# Patient Record
Sex: FEMALE | Race: WHITE | NOT HISPANIC OR LATINO | Employment: OTHER | URBAN - METROPOLITAN AREA
[De-identification: names, ages, dates, MRNs, and addresses within clinical notes are randomized per-mention and may not be internally consistent; named-entity substitution may affect disease eponyms.]

---

## 2017-04-19 ENCOUNTER — ALLSCRIPTS OFFICE VISIT (OUTPATIENT)
Dept: OTHER | Facility: OTHER | Age: 72
End: 2017-04-19

## 2017-04-26 DIAGNOSIS — Z12.31 ENCOUNTER FOR SCREENING MAMMOGRAM FOR MALIGNANT NEOPLASM OF BREAST: ICD-10-CM

## 2017-11-21 ENCOUNTER — ANESTHESIA EVENT (OUTPATIENT)
Dept: PERIOP | Facility: AMBULARY SURGERY CENTER | Age: 72
End: 2017-11-21
Payer: COMMERCIAL

## 2017-12-05 ENCOUNTER — ANESTHESIA (OUTPATIENT)
Dept: PERIOP | Facility: AMBULARY SURGERY CENTER | Age: 72
End: 2017-12-05
Payer: COMMERCIAL

## 2017-12-05 ENCOUNTER — HOSPITAL ENCOUNTER (OUTPATIENT)
Facility: AMBULARY SURGERY CENTER | Age: 72
Setting detail: OUTPATIENT SURGERY
Discharge: HOME | End: 2017-12-05
Attending: SURGERY | Admitting: SURGERY
Payer: COMMERCIAL

## 2017-12-05 VITALS
OXYGEN SATURATION: 98 % | DIASTOLIC BLOOD PRESSURE: 70 MMHG | TEMPERATURE: 99.6 F | SYSTOLIC BLOOD PRESSURE: 118 MMHG | BODY MASS INDEX: 23.92 KG/M2 | WEIGHT: 135 LBS | HEART RATE: 74 BPM | HEIGHT: 63 IN | RESPIRATION RATE: 20 BRPM

## 2017-12-05 DIAGNOSIS — Z80.0 FAMILY HISTORY OF MALIGNANT NEOPLASM OF DIGESTIVE ORGAN: ICD-10-CM

## 2017-12-05 PROCEDURE — 88305 TISSUE EXAM BY PATHOLOGIST: CPT | Performed by: SURGERY

## 2017-12-05 RX ORDER — PROPOFOL 10 MG/ML
INJECTION, EMULSION INTRAVENOUS AS NEEDED
Status: DISCONTINUED | OUTPATIENT
Start: 2017-12-05 | End: 2017-12-05 | Stop reason: SURG

## 2017-12-05 RX ORDER — SODIUM CHLORIDE 9 MG/ML
125 INJECTION, SOLUTION INTRAVENOUS CONTINUOUS
Status: DISCONTINUED | OUTPATIENT
Start: 2017-12-05 | End: 2017-12-05 | Stop reason: HOSPADM

## 2017-12-05 RX ADMIN — PROPOFOL 80 MG: 10 INJECTION, EMULSION INTRAVENOUS at 14:19

## 2017-12-05 RX ADMIN — SODIUM CHLORIDE 125 ML/HR: 0.9 INJECTION, SOLUTION INTRAVENOUS at 13:16

## 2017-12-05 RX ADMIN — PROPOFOL 50 MG: 10 INJECTION, EMULSION INTRAVENOUS at 14:22

## 2017-12-05 RX ADMIN — PROPOFOL 20 MG: 10 INJECTION, EMULSION INTRAVENOUS at 14:26

## 2017-12-05 NOTE — ANESTHESIA POSTPROCEDURE EVALUATION
Post-Op Assessment Note      CV Status:  Stable    Mental Status:  Alert and awake    Hydration Status:  Euvolemic    PONV Controlled:  Controlled    Airway Patency:  Patent    Post Op Vitals Reviewed: Yes          Staff: CRNA           BP      Temp     Pulse     Resp      SpO2

## 2017-12-05 NOTE — ANESTHESIA PREPROCEDURE EVALUATION
Review of Systems/Medical History  Patient summary reviewed  Chart reviewed  No history of anesthetic complications     Cardiovascular  Exercise tolerance: good,     Pulmonary  Negative pulmonary ROS ,        GI/Hepatic  Negative GI/hepatic ROS          Negative  ROS        Endo/Other  Negative endo/other ROS      GYN  Negative gynecology ROS          Hematology  Negative hematology ROS      Musculoskeletal  Negative musculoskeletal ROS        Neurology  Negative neurology ROS      Psychology   Negative psychology ROS            Physical Exam    Airway    Mallampati score: I  TM Distance: >3 FB  Neck ROM: full     Dental   No notable dental hx     Cardiovascular      Pulmonary      Other Findings        Anesthesia Plan  ASA Score- 1       Anesthesia Type- IV sedation with anesthesia with ASA Monitors  Additional Monitors:   Airway Plan:           Induction- intravenous  Informed Consent- Anesthetic plan and risks discussed with patient  I personally reviewed this patient with the CRNA  Discussed and agreed on the Anesthesia Plan with the CRNA  Huong Gimenez

## 2017-12-05 NOTE — H&P
History and Physical -General Surgical Care   Miranda Sommer 66 y o  female MRN: [id number]  Unit/Bed#: [facility] [place] Encounter: [id number]       Principal Problem:  Family history of colon cancer    HPI: Miranda Sommer is a 66y o  year old female who presents with family history of colon cancer  Last colonoscope was approximately 5 years ago  She denies any specific problems presently      Review of Systems    Historical Information   History reviewed  No pertinent past medical history  Past Surgical History:   Procedure Laterality Date    SKIN CANCER EXCISION      TONSILLECTOMY       Social History   History   Alcohol use Not on file     Comment: once or twice per year     History   Drug Use No     History   Smoking Status    Never Smoker   Smokeless Tobacco    Never Used     Family History   Problem Relation Age of Onset    Cancer Sister      colon and liver       Meds/Allergies     Prescriptions Prior to Admission   Medication    Cholecalciferol (VITAMIN D PO)    Multiple Vitamins-Minerals (MULTIVITAL PO)     Current Facility-Administered Medications   Medication Dose Route Frequency    sodium chloride 0 9 % infusion  125 mL/hr Intravenous Continuous       No Known Allergies        Blood pressure 130/66, pulse 85, temperature (!) 97 °F (36 1 °C), resp  rate 20, height 5' 3" (1 6 m), weight 61 2 kg (135 lb), SpO2 98 %  No intake or output data in the 24 hours ending 12/05/17 1410    PHYSICAL EXAM  General appearance: alert, appears stated age and cooperative  Lungs: clear to auscultation bilaterally  Heart: regular rate and rhythm, S1, S2 normal, no murmur, click, rub or gallop  Abdomen: soft, non-tender; bowel sounds normal; no masses,  no organomegaly  Rectal: deferred  Skin: Skin color, texture, turgor normal  No rashes or lesions    Lab Results:   No visits with results within 1 Day(s) from this visit  Latest known visit with results is:   No results found for any previous visit       Imaging Studies:     ASSESSMENT:  Family history of colon cancer    PLAN:  Risks and benefits for colonoscopy were discussed with her and she agrees to proceed    Counseling / Coordination of Care  Total time spent today  20 minutes  Greater than 50% of total time was spent with the patient and / or family counseling and / or coordination of care

## 2017-12-05 NOTE — OP NOTE
OPERATIVE REPORT  PATIENT NAME: Margarita Durbin    :  1945  MRN: [id number]  Pt Location: [facility]  GI ROOM 02    SURGERY DATE: 2017    Surgeon(s) and Role:     * Rich Araya DO - Primary    Preop Diagnosis:  Family history of malignant neoplasm of digestive organ [Z80 0]    Post-Op Diagnosis Codes:     * Family history of malignant neoplasm of digestive organ [Z80 0]    Procedure(s) (LRB):  COLONOSCOPY (N/A)    Specimen(s):  ID Type Source Tests Collected by Time Destination   1 : ascending colon polyp hot bx Tissue Polyp, Colorectal TISSUE EXAM Rich Diop,  2017 1428        Estimated Blood Loss:   Minimal    Drains:       Anesthesia Type:   IV Sedation with Anesthesia    Operative Indications:  Family history of malignant neoplasm of digestive organ [Z80 0]      Operative Findings: Thin benign appearing mucosal polyp of the ascending colon  Multiple hot biopsies were performed    Complications:   None    Procedure and Technique:  Patient was brought to the operative suite and identified by visualization, conversation, by armband  She was then placed in the left lateral side  Time-out was performed  After induction of IV sedation rectal exam was performed  No abnormalities were noted  Colonoscope was then inserted into the rectum and advanced all the way to the cecum  External pressure was required to help advance the scope  Cecum was identified by the appendiceal orifice  In the ascending colon there was a thickened area of of the bowels  Multiple hot biopsies were performed as this appeared to be a early polyp  There was good hemostasis  Circumferential views were performed of the colon on the way out  Once into the rectum, scope was retroflexed  No abnormalities were seen  She returned to the recovery area in stable condition      Recommend repeat colonoscopy in 3 years due to discovery of a polyp     I was present for the entire procedure    Patient Disposition:  PACU SIGNATURE: Royal Lopez DO  DATE: December 5, 2017  TIME: 2:37 PM

## 2018-01-13 VITALS
WEIGHT: 141 LBS | BODY MASS INDEX: 24.98 KG/M2 | HEIGHT: 63 IN | DIASTOLIC BLOOD PRESSURE: 70 MMHG | SYSTOLIC BLOOD PRESSURE: 106 MMHG

## 2018-05-01 ENCOUNTER — ANNUAL EXAM (OUTPATIENT)
Dept: OBGYN CLINIC | Facility: CLINIC | Age: 73
End: 2018-05-01
Payer: COMMERCIAL

## 2018-05-01 VITALS
WEIGHT: 131.2 LBS | SYSTOLIC BLOOD PRESSURE: 100 MMHG | HEIGHT: 62 IN | DIASTOLIC BLOOD PRESSURE: 60 MMHG | BODY MASS INDEX: 24.14 KG/M2

## 2018-05-01 DIAGNOSIS — Z12.31 ENCOUNTER FOR SCREENING MAMMOGRAM FOR MALIGNANT NEOPLASM OF BREAST: ICD-10-CM

## 2018-05-01 DIAGNOSIS — Z01.419 ENCOUNTER FOR GYNECOLOGICAL EXAMINATION WITHOUT ABNORMAL FINDING: Primary | ICD-10-CM

## 2018-05-01 DIAGNOSIS — M85.80 OSTEOPENIA, UNSPECIFIED LOCATION: ICD-10-CM

## 2018-05-01 PROCEDURE — S0612 ANNUAL GYNECOLOGICAL EXAMINA: HCPCS | Performed by: OBSTETRICS & GYNECOLOGY

## 2018-05-24 ENCOUNTER — TRANSCRIBE ORDERS (OUTPATIENT)
Dept: RADIOLOGY | Facility: CLINIC | Age: 73
End: 2018-05-24

## 2018-05-30 ENCOUNTER — HOSPITAL ENCOUNTER (OUTPATIENT)
Dept: RADIOLOGY | Age: 73
Discharge: HOME | End: 2018-05-30
Payer: COMMERCIAL

## 2018-05-30 DIAGNOSIS — M85.80 OSTEOPENIA, UNSPECIFIED LOCATION: ICD-10-CM

## 2018-05-30 PROCEDURE — 77080 DXA BONE DENSITY AXIAL: CPT

## 2018-06-06 ENCOUNTER — TELEPHONE (OUTPATIENT)
Dept: OBGYN CLINIC | Facility: CLINIC | Age: 73
End: 2018-06-06

## 2018-06-06 NOTE — TELEPHONE ENCOUNTER
----- Message from Ras Calderon MD sent at 6/6/2018 10:32 AM EDT -----  Can you please let Masha Rothman know I reviewed her DEXA scan  She still has osteopenia, which has not changed significantly since her last   They do recommend considering treatment because her hip fracture risk is 3%  If she would like to consider treatment please schedule her for an office visit  If she declines - would advise calcium and vitamin D  Thanks!

## 2018-06-06 NOTE — TELEPHONE ENCOUNTER
Left voicemail message today @ [phone] and [phone] for Pt to call back office regarding DEXA Scan result

## 2018-06-06 NOTE — TELEPHONE ENCOUNTER
Spoke with Pt today via phone call  Pt informed that recent DEXA Scan result still shows osteopenia which has not changed significantly since Pt's last DEXA Scan per Dr Gunter Lot review  Pt further informed that it is recommended that she consider treatment due to her hip fracture risk being 3% at this time per Dr Padmini Medina  Pt states that she is not interested in treatment at this time and would like to try OTC supplements first   Pt informed that a daily intake of at least 1200 mg Calcium and 800 to 1000 IU of Vitamin D supplements as well as weight bearing & muscle strengthening exercise, fall prevention, avoidance of tobacco, and excessive alcohol intake are recommended  Reiterated to Pt that if her symptoms worsen or she has questions/concerns, to contact office

## 2018-12-18 NOTE — DISCHARGE INSTRUCTIONS
Small polyp was found in the ascending colon  Hot biopsies were performed  Avoid aspirin or ibuprofen for 7 days    May continue regular diet      Resume home medications    Repeat colonoscopy in 3 years    Call Dr Dereck Smart at [phone] a questions or concerns Skin Substitute Text: The defect edges were debeveled with a #15 scalpel blade.  Given the location of the defect, shape of the defect and the proximity to free margins a skin substitute graft was deemed most appropriate.  The graft material was trimmed to fit the size of the defect. The graft was then placed in the primary defect and oriented appropriately.

## 2019-05-03 ENCOUNTER — ANNUAL EXAM (OUTPATIENT)
Dept: OBGYN CLINIC | Age: 74
End: 2019-05-03
Payer: COMMERCIAL

## 2019-05-03 VITALS
DIASTOLIC BLOOD PRESSURE: 62 MMHG | WEIGHT: 130 LBS | BODY MASS INDEX: 23.92 KG/M2 | SYSTOLIC BLOOD PRESSURE: 112 MMHG | HEIGHT: 62 IN

## 2019-05-03 DIAGNOSIS — Z78.0 ASYMPTOMATIC POSTMENOPAUSAL STATUS: ICD-10-CM

## 2019-05-03 DIAGNOSIS — Z12.31 ENCOUNTER FOR SCREENING MAMMOGRAM FOR MALIGNANT NEOPLASM OF BREAST: ICD-10-CM

## 2019-05-03 DIAGNOSIS — Z01.419 ENCOUNTER FOR GYNECOLOGICAL EXAMINATION WITHOUT ABNORMAL FINDING: Primary | ICD-10-CM

## 2019-05-03 PROCEDURE — S0612 ANNUAL GYNECOLOGICAL EXAMINA: HCPCS | Performed by: NURSE PRACTITIONER

## 2021-06-09 ENCOUNTER — OFFICE VISIT (OUTPATIENT)
Dept: FAMILY MEDICINE CLINIC | Facility: CLINIC | Age: 76
End: 2021-06-09
Payer: COMMERCIAL

## 2021-06-09 VITALS
OXYGEN SATURATION: 96 % | HEART RATE: 85 BPM | DIASTOLIC BLOOD PRESSURE: 70 MMHG | WEIGHT: 138.6 LBS | BODY MASS INDEX: 24.56 KG/M2 | HEIGHT: 63 IN | TEMPERATURE: 98.4 F | RESPIRATION RATE: 16 BRPM | SYSTOLIC BLOOD PRESSURE: 130 MMHG

## 2021-06-09 DIAGNOSIS — M25.562 ACUTE PAIN OF LEFT KNEE: ICD-10-CM

## 2021-06-09 DIAGNOSIS — M85.80 OSTEOPENIA, UNSPECIFIED LOCATION: ICD-10-CM

## 2021-06-09 DIAGNOSIS — Z85.820 PERSONAL HISTORY OF MALIGNANT MELANOMA: ICD-10-CM

## 2021-06-09 DIAGNOSIS — Z78.0 POST-MENOPAUSE: ICD-10-CM

## 2021-06-09 DIAGNOSIS — E78.5 HYPERLIPIDEMIA, UNSPECIFIED HYPERLIPIDEMIA TYPE: ICD-10-CM

## 2021-06-09 DIAGNOSIS — Z00.00 PHYSICAL EXAM: Primary | ICD-10-CM

## 2021-06-09 PROCEDURE — 1160F RVW MEDS BY RX/DR IN RCRD: CPT | Performed by: NURSE PRACTITIONER

## 2021-06-09 PROCEDURE — 3725F SCREEN DEPRESSION PERFORMED: CPT | Performed by: NURSE PRACTITIONER

## 2021-06-09 PROCEDURE — 1036F TOBACCO NON-USER: CPT | Performed by: NURSE PRACTITIONER

## 2021-06-09 PROCEDURE — 99387 INIT PM E/M NEW PAT 65+ YRS: CPT | Performed by: NURSE PRACTITIONER

## 2021-06-09 NOTE — PROGRESS NOTES
FAMILY PRACTICE OFFICE VISIT       NAME: Geovani Verde  AGE: 67 y o  SEX: female       : 1945        MRN: [id number]    Assessment and Plan     Problem List Items Addressed This Visit        Musculoskeletal and Integument    Osteopenia    Relevant Orders    DXA bone density spine hip and pelvis    CBC (Completed)    Comprehensive metabolic panel (Completed)    TSH, 3rd generation (Completed)       Other    Hyperlipidemia    Relevant Orders    Lipid panel (Completed)      Other Visit Diagnoses     Physical exam    -  Primary    Acute pain of left knee        Relevant Orders    CBC (Completed)    Comprehensive metabolic panel (Completed)    TSH, 3rd generation (Completed)    XR knee 3 vw left non injury    Post-menopause        Relevant Orders    DXA bone density spine hip and pelvis    Personal history of malignant melanoma              1  Physical exam     2  Acute pain of left knee  CBC    Comprehensive metabolic panel    TSH, 3rd generation    XR knee 3 vw left non injury   3  Osteopenia, unspecified location  DXA bone density spine hip and pelvis    CBC    Comprehensive metabolic panel    TSH, 3rd generation   4  Post-menopause  DXA bone density spine hip and pelvis   5  Hyperlipidemia, unspecified hyperlipidemia type  Lipid panel   6  Personal history of malignant melanoma       This 74-year-old female presents today for annual physical exam   She is a healthy-appearing adult female  New patient to our office, previous patient of Dr Maame Lilly  I have a hard copy of her previous records available to me in office today, and records are reviewed  Past medical history includes diverticulosis, osteopenia, hyperlipidemia, malignant melanoma excised in   Surgical history includes total hysterectomy with bilateral salpingo oophorectomy, tonsillectomy  Significant family history of cancer  Twin sister  from liver cancer and colon cancer  Mother  from breast cancer at age 61  Father had cancer, she is unsure believes it was esophageal cancer  Mammogram is due now  She is scheduled for repeat  Due for repeat DEXA scan  Prescription provided   Due for repeat colonoscopy  She will schedule with Dr Aminah Callaway  COVID-19 vaccinations are up-to-date  She will hold off on other vaccinations at this time  Acute left knee pain: I suspect this is osteoarthritis  Will check left knee x-ray  Recommend ice, elevation as needed  Tylenol or ibuprofen as needed  Advised use ibuprofen sparingly, as this medication increases risk for heart attack and stroke,  Also increases blood pressure  We discussed physical therapy, she would like to hold off at this time  If pain is persistent  Or worsening she will call  Osteopenia and post menopause:  Left hip osteopenia noted on DEXA scan completed in 2018  Recommend repeating DEXA scan  She is very active with weight-bearing activities  Takes vitamin-D and a multivitamin daily  Hyperlipidemia:  Will repeat lipid panel at this time  History of malignant melanoma:  Recommend annual dermatology skin surveillance exam     Follow up in one year for annual physical or sooner if needed  Chief Complaint     Chief Complaint   Patient presents with    Knee Pain     NP is here to est care and left knee pain       History of Present Illness     Torsten Swanson  Is a 79-year-old female presenting today to establish care at our office and for left knee pain  She is a prior patient of Dr Tc Flores  I have her hard copy prior records available in office today I have reviewed them  Past medical history includes diverticulosis, osteopenia, hyperlipidemia, malignant melanoma excised in   Surgical history includes  Total hysterectomy with bilateral salpingo-oophorectomy,  Tonsillectomy  Significant family history of cancer    Twin sister  from liver and colon cancer, mother breast cancer, father, unsure, but she believes esophageal cancer  She is   Recently retired, worked many years at [employer] as an     Exercise: not formally but stays very active  Enjoys gardening and yard work  She does track her steps and walks about 1-4 miles per day  Last mammogram May 13th, 2020  She is scheduled for repeat  Last colonoscopy 2017, with Dr Tyler Bangura  She is due and planning to reschedule  She was due last year, but COVID-19 caused delay  Last Dexa scan 2018--osteopenia left hip  No recent blood work  Never smoked  Left knee pain started 2 5- 3 weeks ago  Has been doing a lot of gardening, pulling weeds  Knee is swollen and feels stiff, but no injury  Is stiff to get up and start moving, better after moving for a bit  She is able to kneel on her knee  Left knee extension is normal  Flexion is only slightly limited  No difficulty sleeping due to pain  No locking or giving out  Limps up stairs but does okay, just is more careful not to fall  No past or present injury  Always has been active, basketball, biking, swimming, when younger, as an adult, loves outdoor activities, especially boating  Ibuprofen--took twice and did help  Following with podiatry for left bunion, which is pushing great toe into second toe  Second toe is hammer toe  Using a lift, but feels it is too tight  Today has 2nd and 3rd toes shama taped               Review of Systems   Review of Systems   Constitutional: Negative  HENT: Negative  Respiratory: Negative  Cardiovascular: Negative  Gastrointestinal: Negative  Genitourinary: Negative  Musculoskeletal: Positive for arthralgias (left knee pain and swelling)  Skin: Negative  Neurological: Negative  Hematological: Negative  Psychiatric/Behavioral: Negative          Active Problem List     Patient Active Problem List   Diagnosis    Osteopenia    Asymptomatic postmenopausal status    Hyperlipidemia    Diverticulosis       Past Medical History:  Past Medical History:   Diagnosis Date    Degenerative disc disease, lumbar 6/10/2021    Dysplastic nevus     On Patient's Chest    Malignant melanoma ([address] )     Last Assessed 6/09/2015       Past Surgical History:  Past Surgical History:   Procedure Laterality Date    DILATION AND CURETTAGE OF UTERUS      LAPAROSCOPIC TOTAL HYSTERECTOMY  11/20/2010    ND COLONOSCOPY FLX DX W/COLLJ SPEC WHEN PFRMD N/A 12/5/2017    Procedure: COLONOSCOPY;  Surgeon: Lakeshia Woods DO;  Location: [facility];   Service: General    SALPINGOOPHORECTOMY Bilateral     Last Assessed 4/01/2016    SKIN CANCER EXCISION  2004    melanoma excision right buttocks    SKIN LESION EXCISION      Trunk Upper Chest    TONSILLECTOMY         Family History:  Family History   Problem Relation Age of Onset   Ardeth Needs Cancer Sister         colon and liver    Colon cancer Sister     Breast cancer Mother 61    Stomach cancer Father         esophageal    Ovarian cancer Neg Hx     Uterine cancer Neg Hx     Cervical cancer Neg Hx        Social History:  Social History     Socioeconomic History    Marital status: /Civil Union     Spouse name: Not on file    Number of children: Not on file    Years of education: Not on file    Highest education level: Not on file   Occupational History    Not on file   Social Needs    Financial resource strain: Not on file    Food insecurity     Worry: Not on file     Inability: Not on file   [employer] needs     Medical: Not on file     Non-medical: Not on file   Tobacco Use    Smoking status: Never Smoker    Smokeless tobacco: Never Used   Substance and Sexual Activity    Alcohol use: Yes     Comment: rare alcohol use    Drug use: No    Sexual activity: Yes     Partners: Male     Birth control/protection: Post-menopausal, Surgical   Lifestyle    Physical activity     Days per week: Not on file     Minutes per session: Not on file    Stress: Not on file   Relationships    Social connections     Talks on phone: Not on file     Gets together: Not on file     Attends Anabaptist service: Not on file     Active member of club or organization: Not on file     Attends meetings of clubs or organizations: Not on file     Relationship status: Not on file    Intimate partner violence     Fear of current or ex partner: Not on file     Emotionally abused: Not on file     Physically abused: Not on file     Forced sexual activity: Not on file   Other Topics Concern    Not on file   Social History Narrative    Daily Coffee Consumption (2 Cups/day)    Moderate Exercising Less Than 3 Times a week       I have reviewed the patient's medical history in detail; there are no changes to the history as noted in the electronic medical record  Objective     Vitals:    06/09/21 1555   BP: 130/70   Pulse: 85   Resp: 16   Temp: 98 4 °F (36 9 °C)   TempSrc: Temporal   SpO2: 96%   Weight: 62 9 kg (138 lb 9 6 oz)   Height: 5' 2 8" (1 595 m)     Wt Readings from Last 3 Encounters:   06/09/21 62 9 kg (138 lb 9 6 oz)   05/03/19 59 kg (130 lb)   05/01/18 59 5 kg (131 lb 3 2 oz)     Physical Exam  Vitals signs and nursing note reviewed  Constitutional:       General: She is not in acute distress  Appearance: Normal appearance  She is well-developed  She is not ill-appearing, toxic-appearing or diaphoretic  HENT:      Head: Normocephalic and atraumatic  Right Ear: Tympanic membrane and ear canal normal       Left Ear: Tympanic membrane and ear canal normal       Mouth/Throat:      Mouth: Mucous membranes are moist       Pharynx: Oropharynx is clear  Eyes:      Conjunctiva/sclera: Conjunctivae normal       Pupils: Pupils are equal, round, and reactive to light  Neck:      Musculoskeletal: Normal range of motion and neck supple  Thyroid: No thyromegaly  Vascular: No carotid bruit  Cardiovascular:      Rate and Rhythm: Normal rate and regular rhythm  Heart sounds: No murmur  Pulmonary:      Effort: Pulmonary effort is normal  No respiratory distress  Breath sounds: Normal breath sounds  No wheezing or rales  Abdominal:      General: Bowel sounds are normal       Palpations: Abdomen is soft  Tenderness: There is no abdominal tenderness  Musculoskeletal:      Left knee: She exhibits decreased range of motion (She is able to, but is it more difficult to flex more than about 110-115 degrees) and swelling (mild swelling, no redness, no skin changes)  She exhibits no deformity  No tenderness found  Right lower leg: No edema  Left lower leg: No edema  Lymphadenopathy:      Cervical: No cervical adenopathy  Skin:     General: Skin is warm and dry  Findings: No rash  Neurological:      Mental Status: She is alert and oriented to person, place, and time  Psychiatric:         Mood and Affect: Mood normal             ALLERGIES:  Allergies   Allergen Reactions    Cephalexin Hives and Rash       Current Medications     Current Outpatient Medications   Medication Sig Dispense Refill    Cholecalciferol (VITAMIN D PO) Take 1 capsule by mouth daily      Multiple Vitamins-Minerals (MULTIVITAL PO) Take 1 tablet by mouth daily       No current facility-administered medications for this visit            Health Maintenance     Health Maintenance   Topic Date Due    Annual Physical  Never done    DTaP,Tdap,and Td Vaccines (1 - Tdap) Never done    Fall Risk  Never done    Pneumococcal Vaccine: 65+ Years (1 of 1 - PPSV23) Never done    Influenza Vaccine (Season Ended) 09/01/2021    Depression Screening PHQ  06/09/2022    BMI: Adult  06/09/2022    COVID-19 Vaccine  Completed    HIB Vaccine  Aged Out    Hepatitis B Vaccine  Aged Out    IPV Vaccine  Aged Out    Hepatitis A Vaccine  Aged Out    Meningococcal ACWY Vaccine  Aged Out    HPV Vaccine  Aged Out     Immunization History   Administered Date(s) Administered    INFLUENZA 10/24/2014, 10/06/2015, 10/20/2016, 10/14/2017    SARS-CoV-2 / COVID-19 mRNA IM ([facility]) 04/16/2021, 05/17/2021    influenza, trivalent, adjuvanted 11/19/2019       ALEKSANDER Mckenzie

## 2021-06-10 ENCOUNTER — APPOINTMENT (OUTPATIENT)
Dept: LAB | Facility: CLINIC | Age: 76
End: 2021-06-10
Payer: COMMERCIAL

## 2021-06-10 ENCOUNTER — APPOINTMENT (OUTPATIENT)
Dept: RADIOLOGY | Facility: CLINIC | Age: 76
End: 2021-06-10
Payer: COMMERCIAL

## 2021-06-10 DIAGNOSIS — M25.562 ACUTE PAIN OF LEFT KNEE: ICD-10-CM

## 2021-06-10 DIAGNOSIS — R73.01 IMPAIRED FASTING GLUCOSE: Primary | ICD-10-CM

## 2021-06-10 DIAGNOSIS — Z13.220 LIPID SCREENING: ICD-10-CM

## 2021-06-10 DIAGNOSIS — R73.01 IMPAIRED FASTING GLUCOSE: ICD-10-CM

## 2021-06-10 DIAGNOSIS — M85.80 OSTEOPENIA, UNSPECIFIED LOCATION: ICD-10-CM

## 2021-06-10 PROBLEM — K57.90 DIVERTICULOSIS: Status: ACTIVE | Noted: 2021-06-10

## 2021-06-10 PROBLEM — M51.369 DEGENERATIVE DISC DISEASE, LUMBAR: Status: RESOLVED | Noted: 2021-06-10 | Resolved: 2021-06-10

## 2021-06-10 PROBLEM — E78.5 HYPERLIPIDEMIA: Status: ACTIVE | Noted: 2021-06-10

## 2021-06-10 PROBLEM — M51.369 DEGENERATIVE DISC DISEASE, LUMBAR: Status: ACTIVE | Noted: 2021-06-10

## 2021-06-10 PROBLEM — M51.36 DEGENERATIVE DISC DISEASE, LUMBAR: Status: RESOLVED | Noted: 2021-06-10 | Resolved: 2021-06-10

## 2021-06-10 PROBLEM — M51.36 DEGENERATIVE DISC DISEASE, LUMBAR: Status: ACTIVE | Noted: 2021-06-10

## 2021-06-10 LAB
ALBUMIN SERPL BCP-MCNC: 3.6 G/DL (ref 3.5–5)
ALP SERPL-CCNC: 63 U/L (ref 46–116)
ALT SERPL W P-5'-P-CCNC: 17 U/L (ref 12–78)
ANION GAP SERPL CALCULATED.3IONS-SCNC: 4 MMOL/L (ref 4–13)
AST SERPL W P-5'-P-CCNC: 16 U/L (ref 5–45)
BILIRUB SERPL-MCNC: 0.35 MG/DL (ref 0.2–1)
BUN SERPL-MCNC: 13 MG/DL (ref 5–25)
CALCIUM SERPL-MCNC: 9 MG/DL (ref 8.3–10.1)
CHLORIDE SERPL-SCNC: 105 MMOL/L (ref 100–108)
CHOLEST SERPL-MCNC: 224 MG/DL (ref 50–200)
CO2 SERPL-SCNC: 28 MMOL/L (ref 21–32)
CREAT SERPL-MCNC: 0.79 MG/DL (ref 0.6–1.3)
ERYTHROCYTE [DISTWIDTH] IN BLOOD BY AUTOMATED COUNT: 12.7 % (ref 11.6–15.1)
EST. AVERAGE GLUCOSE BLD GHB EST-MCNC: 108 MG/DL
GFR SERPL CREATININE-BSD FRML MDRD: 73 ML/MIN/1.73SQ M
GLUCOSE P FAST SERPL-MCNC: 104 MG/DL (ref 65–99)
HBA1C MFR BLD: 5.4 %
HCT VFR BLD AUTO: 38.8 % (ref 34.8–46.1)
HDLC SERPL-MCNC: 76 MG/DL
HGB BLD-MCNC: 12.9 G/DL (ref 11.5–15.4)
LDLC SERPL CALC-MCNC: 123 MG/DL (ref 0–100)
MCH RBC QN AUTO: 29.1 PG (ref 26.8–34.3)
MCHC RBC AUTO-ENTMCNC: 33.2 G/DL (ref 31.4–37.4)
MCV RBC AUTO: 88 FL (ref 82–98)
NONHDLC SERPL-MCNC: 148 MG/DL
PLATELET # BLD AUTO: 318 THOUSANDS/UL (ref 149–390)
PMV BLD AUTO: 10.5 FL (ref 8.9–12.7)
POTASSIUM SERPL-SCNC: 3.9 MMOL/L (ref 3.5–5.3)
PROT SERPL-MCNC: 7 G/DL (ref 6.4–8.2)
RBC # BLD AUTO: 4.43 MILLION/UL (ref 3.81–5.12)
SODIUM SERPL-SCNC: 137 MMOL/L (ref 136–145)
TRIGL SERPL-MCNC: 127 MG/DL
TSH SERPL DL<=0.05 MIU/L-ACNC: 1.21 UIU/ML (ref 0.36–3.74)
WBC # BLD AUTO: 6.31 THOUSAND/UL (ref 4.31–10.16)

## 2021-06-10 PROCEDURE — 80053 COMPREHEN METABOLIC PANEL: CPT

## 2021-06-10 PROCEDURE — 73562 X-RAY EXAM OF KNEE 3: CPT

## 2021-06-10 PROCEDURE — 80061 LIPID PANEL: CPT

## 2021-06-10 PROCEDURE — 84443 ASSAY THYROID STIM HORMONE: CPT

## 2021-06-10 PROCEDURE — 83036 HEMOGLOBIN GLYCOSYLATED A1C: CPT

## 2021-06-10 PROCEDURE — 36415 COLL VENOUS BLD VENIPUNCTURE: CPT

## 2021-06-10 PROCEDURE — 85027 COMPLETE CBC AUTOMATED: CPT

## 2021-06-11 ENCOUNTER — TELEPHONE (OUTPATIENT)
Dept: FAMILY MEDICINE CLINIC | Facility: CLINIC | Age: 76
End: 2021-06-11

## 2021-06-11 NOTE — TELEPHONE ENCOUNTER
----- Message from [id number] [facility] sent at 6/10/2021 10:29 PM EDT -----  Please let patient know blood is good, except cholesterol is high  I would recommend taking a cholesterol lowering medication  If she is willing to take a medication to lower cholesterol, please let me know and I will send a prescription  Regardless, she needs to stay active  She also may try eating a mediterranean diet, which is a good diet for heart health

## 2021-06-21 ENCOUNTER — TELEPHONE (OUTPATIENT)
Dept: FAMILY MEDICINE CLINIC | Facility: CLINIC | Age: 76
End: 2021-06-21

## 2021-06-21 NOTE — TELEPHONE ENCOUNTER
----- [id number] sent at 6/20/2021 11:22 AM EDT -----  Please let Ajay Sawyerstevan know her x-ray shows arthritis in her knee  Please advise her to call for worsening pain

## 2021-09-08 ENCOUNTER — HOSPITAL ENCOUNTER (OUTPATIENT)
Dept: RADIOLOGY | Age: 76
Discharge: HOME | End: 2021-09-08
Payer: COMMERCIAL

## 2021-09-08 DIAGNOSIS — Z78.0 POST-MENOPAUSE: ICD-10-CM

## 2021-09-08 DIAGNOSIS — M85.80 OSTEOPENIA, UNSPECIFIED LOCATION: ICD-10-CM

## 2021-09-08 PROCEDURE — 77080 DXA BONE DENSITY AXIAL: CPT

## 2021-09-09 ENCOUNTER — TELEPHONE (OUTPATIENT)
Dept: FAMILY MEDICINE CLINIC | Facility: CLINIC | Age: 76
End: 2021-09-09

## 2021-09-09 NOTE — TELEPHONE ENCOUNTER
----- Message from [id number]ash[address] sent at 9/9/2021  3:15 PM EDT -----    Please let patient know her DEXA scan continues to show osteopenia  Scan is slightly worse than last scan in 2018  Treatment is recommended  Please ask her to schedule an office visit if she would like to discuss treatment  Otherwise continue to make sure she is getting 1200 mg of calcium daily, 800 to 1000 units of vitamin-D daily, and regular weight-bearing exercise

## 2021-09-09 NOTE — TELEPHONE ENCOUNTER
Spoke with patient and gave her results and instructions    She does not want to seek treatment at this time and follow medical advise

## 2022-10-20 ENCOUNTER — PREP FOR PROCEDURE (OUTPATIENT)
Dept: SURGERY | Facility: CLINIC | Age: 77
End: 2022-10-20

## 2022-10-20 DIAGNOSIS — Z80.0 FAMILY HISTORY OF COLON CANCER REQUIRING SCREENING COLONOSCOPY: Primary | ICD-10-CM

## 2022-11-02 ENCOUNTER — OFFICE VISIT (OUTPATIENT)
Dept: SURGERY | Facility: CLINIC | Age: 77
End: 2022-11-02

## 2022-11-02 VITALS
HEART RATE: 87 BPM | BODY MASS INDEX: 23.04 KG/M2 | DIASTOLIC BLOOD PRESSURE: 69 MMHG | SYSTOLIC BLOOD PRESSURE: 132 MMHG | HEIGHT: 63 IN | WEIGHT: 130 LBS

## 2022-11-02 DIAGNOSIS — Z86.010 PERSONAL HISTORY OF COLONIC POLYPS: Primary | ICD-10-CM

## 2022-11-02 DIAGNOSIS — Z80.0 FAMILY HISTORY OF COLON CANCER: ICD-10-CM

## 2022-11-02 PROBLEM — Z86.0100 PERSONAL HISTORY OF COLONIC POLYPS: Status: ACTIVE | Noted: 2022-11-02

## 2022-11-02 NOTE — H&P (VIEW-ONLY)
Assessment/Plan:    Diagnoses and all orders for this visit:    Personal history of colonic polyps    Family history of colon cancer    Risks and benefits of a colonoscopy were discussed with her including potential bleeding or perforation and she agrees to proceed    Subjective:      Patient ID: Jayy Roe is a 67 y o  female  Patient presents for pre colonoscopy consult  Denies problems  Last colonoscopy was 12/5/2017  Final Diagnosis  A  Colon, descending polyp, biopsy:  - Markedly cauterized fragments of serrated polyp   - No definitive high-grade dysplasia identified  She was due for repeat colonoscopy 2020  However because of COVID, this was delayed  She denies any particular colonic problems or weight loss  The following portions of the patient's history were reviewed and updated as appropriate:     She  has a past medical history of Degenerative disc disease, lumbar (6/10/2021), Dysplastic nevus, and Malignant melanoma ([facility] [address] )  She  has a past surgical history that includes Skin cancer excision (2004); Tonsillectomy; pr colonoscopy flx dx w/collj spec when pfrmd (N/A, 12/5/2017); Dilation and curettage of uterus; Skin lesion excision; Laparoscopic total hysterectomy (11/20/2010); and Salpingoophorectomy (Bilateral)  Her family history includes Breast cancer (age of onset: 61) in her mother; Cancer in her sister; Colon cancer in her sister; Stomach cancer in her father  She  reports that she has never smoked  She has never used smokeless tobacco  She reports current alcohol use  She reports that she does not use drugs  Current Outpatient Medications   Medication Sig Dispense Refill   • Ascorbic Acid (vitamin C) 100 MG tablet Take 100 mg by mouth daily     • Cholecalciferol (VITAMIN D PO) Take 1 capsule by mouth daily     • Multiple Vitamins-Minerals (MULTIVITAL PO) Take 1 tablet by mouth daily       No current facility-administered medications for this visit       She is allergic to cephalexin       Review of Systems   Gastrointestinal: Negative for abdominal pain and blood in stool  All other systems reviewed and are negative  Objective:      /69   Pulse 87   Ht 5' 3" (1 6 m)   Wt 59 kg (130 lb)   BMI 23 03 kg/m²          Physical Exam  Constitutional:       General: She is not in acute distress  HENT:      Head: Atraumatic  Mouth/Throat:      Mouth: Mucous membranes are moist    Eyes:      Extraocular Movements: Extraocular movements intact  Cardiovascular:      Rate and Rhythm: Normal rate  Pulmonary:      Effort: Pulmonary effort is normal    Abdominal:      General: Abdomen is flat  Palpations: Abdomen is soft  There is no mass  Tenderness: There is no abdominal tenderness  Hernia: No hernia is present  Musculoskeletal:      Cervical back: Normal range of motion  Skin:     General: Skin is warm and dry  Neurological:      Mental Status: She is alert

## 2022-11-02 NOTE — PROGRESS NOTES
Assessment/Plan:    Diagnoses and all orders for this visit:    Personal history of colonic polyps    Family history of colon cancer    Risks and benefits of a colonoscopy were discussed with her including potential bleeding or perforation and she agrees to proceed    Subjective:      Patient ID: Doroteo Reilly is a 67 y o  female  Patient presents for pre colonoscopy consult  Denies problems  Last colonoscopy was 12/5/2017  Final Diagnosis  A  Colon, descending polyp, biopsy:  - Markedly cauterized fragments of serrated polyp   - No definitive high-grade dysplasia identified  She was due for repeat colonoscopy 2020  However because of COVID, this was delayed  She denies any particular colonic problems or weight loss  The following portions of the patient's history were reviewed and updated as appropriate:     She  has a past medical history of Degenerative disc disease, lumbar (6/10/2021), Dysplastic nevus, and Malignant melanoma ([facility] [address] )  She  has a past surgical history that includes Skin cancer excision (2004); Tonsillectomy; pr colonoscopy flx dx w/collj spec when pfrmd (N/A, 12/5/2017); Dilation and curettage of uterus; Skin lesion excision; Laparoscopic total hysterectomy (11/20/2010); and Salpingoophorectomy (Bilateral)  Her family history includes Breast cancer (age of onset: 61) in her mother; Cancer in her sister; Colon cancer in her sister; Stomach cancer in her father  She  reports that she has never smoked  She has never used smokeless tobacco  She reports current alcohol use  She reports that she does not use drugs  Current Outpatient Medications   Medication Sig Dispense Refill   • Ascorbic Acid (vitamin C) 100 MG tablet Take 100 mg by mouth daily     • Cholecalciferol (VITAMIN D PO) Take 1 capsule by mouth daily     • Multiple Vitamins-Minerals (MULTIVITAL PO) Take 1 tablet by mouth daily       No current facility-administered medications for this visit       She is allergic to cephalexin       Review of Systems   Gastrointestinal: Negative for abdominal pain and blood in stool  All other systems reviewed and are negative  Objective:      /69   Pulse 87   Ht 5' 3" (1 6 m)   Wt 59 kg (130 lb)   BMI 23 03 kg/m²          Physical Exam  Constitutional:       General: She is not in acute distress  HENT:      Head: Atraumatic  Mouth/Throat:      Mouth: Mucous membranes are moist    Eyes:      Extraocular Movements: Extraocular movements intact  Cardiovascular:      Rate and Rhythm: Normal rate  Pulmonary:      Effort: Pulmonary effort is normal    Abdominal:      General: Abdomen is flat  Palpations: Abdomen is soft  There is no mass  Tenderness: There is no abdominal tenderness  Hernia: No hernia is present  Musculoskeletal:      Cervical back: Normal range of motion  Skin:     General: Skin is warm and dry  Neurological:      Mental Status: She is alert

## 2022-11-14 ENCOUNTER — ANESTHESIA EVENT (OUTPATIENT)
Dept: GASTROENTEROLOGY | Facility: HOSPITAL | Age: 77
End: 2022-11-14

## 2022-11-14 ENCOUNTER — HOSPITAL ENCOUNTER (OUTPATIENT)
Dept: GASTROENTEROLOGY | Facility: HOSPITAL | Age: 77
Setting detail: OUTPATIENT SURGERY
Discharge: HOME | End: 2022-11-14
Attending: SURGERY

## 2022-11-14 ENCOUNTER — ANESTHESIA (OUTPATIENT)
Dept: GASTROENTEROLOGY | Facility: HOSPITAL | Age: 77
End: 2022-11-14

## 2022-11-14 VITALS
HEART RATE: 75 BPM | TEMPERATURE: 97.8 F | RESPIRATION RATE: 18 BRPM | SYSTOLIC BLOOD PRESSURE: 115 MMHG | DIASTOLIC BLOOD PRESSURE: 65 MMHG | OXYGEN SATURATION: 95 %

## 2022-11-14 DIAGNOSIS — Z80.0 FAMILY HISTORY OF COLON CANCER REQUIRING SCREENING COLONOSCOPY: ICD-10-CM

## 2022-11-14 RX ORDER — SODIUM CHLORIDE 9 MG/ML
INJECTION, SOLUTION INTRAVENOUS CONTINUOUS PRN
Status: DISCONTINUED | OUTPATIENT
Start: 2022-11-14 | End: 2022-11-14

## 2022-11-14 RX ORDER — PROPOFOL 10 MG/ML
INJECTION, EMULSION INTRAVENOUS AS NEEDED
Status: DISCONTINUED | OUTPATIENT
Start: 2022-11-14 | End: 2022-11-14

## 2022-11-14 RX ADMIN — PROPOFOL 100 MG: 10 INJECTION, EMULSION INTRAVENOUS at 10:04

## 2022-11-14 RX ADMIN — SODIUM CHLORIDE: 9 INJECTION, SOLUTION INTRAVENOUS at 10:00

## 2022-11-14 RX ADMIN — PROPOFOL 50 MG: 10 INJECTION, EMULSION INTRAVENOUS at 10:11

## 2022-11-14 RX ADMIN — PROPOFOL 50 MG: 10 INJECTION, EMULSION INTRAVENOUS at 10:05

## 2022-11-14 RX ADMIN — PROPOFOL 50 MG: 10 INJECTION, EMULSION INTRAVENOUS at 10:07

## 2022-11-14 NOTE — ANESTHESIA PREPROCEDURE EVALUATION
Procedure:  COLONOSCOPY    Relevant Problems   CARDIO   (+) Hyperlipidemia      NEURO/PSYCH   (+) Personal history of colonic polyps   Screening colono     Recent labs personally reviewed:  Lab Results   Component Value Date    WBC 6 31 06/10/2021    HGB 12 9 06/10/2021     06/10/2021     Lab Results   Component Value Date    K 3 9 06/10/2021    BUN 13 06/10/2021    CREATININE 0 79 06/10/2021     No results found for: PTT   No results found for: INR    Lab Results   Component Value Date    HGBA1C 5 4 06/10/2021         Physical Exam    Airway    Mallampati score: II  TM Distance: >3 FB  Neck ROM: full     Dental   No notable dental hx     Cardiovascular  Cardiovascular exam normal    Pulmonary  Pulmonary exam normal     Other Findings        Anesthesia Plan  ASA Score- 2     Anesthesia Type- IV sedation with anesthesia with ASA Monitors  Additional Monitors:   Airway Plan:     Comment: Supplemental O2, etco2 monitoring    Plan Factors-    Chart reviewed  Patient summary reviewed  Patient is not a current smoker  Induction- intravenous  Postoperative Plan-     Informed Consent- Anesthetic plan and risks discussed with patient  I personally reviewed this patient with the CRNA  Discussed and agreed on the Anesthesia Plan with the CRNA  Néstor Garcia

## 2022-11-14 NOTE — DISCHARGE INSTRUCTIONS
Normal colonoscopy  No polyps noted    Small thrombosed right external hemorrhoid    Use warm Sitz baths  High-fiber diet and avoidance of straining    Call with questions or concerns    [phone] Finasteride Pregnancy And Lactation Text: This medication is absolutely contraindicated during pregnancy. It is unknown if it is excreted in breast milk.

## 2022-11-14 NOTE — ANESTHESIA POSTPROCEDURE EVALUATION
Post-Op Assessment Note    CV Status:  Stable  Pain Score: 0    Pain management: adequate     Mental Status:  Alert and awake   Hydration Status:  Euvolemic and stable   PONV Controlled:  Controlled   Airway Patency:  Patent and adequate      Post Op Vitals Reviewed: Yes      Staff: CRNA         No complications documented      BP 97/57 (11/14/22 1022)    Temp 97 8 °F (36 6 °C) (11/14/22 1022)    Pulse 76 (11/14/22 1022)   Resp 18 (11/14/22 1022)    SpO2 98 % (11/14/22 1022)

## 2022-11-14 NOTE — INTERVAL H&P NOTE
H&P reviewed  After examining the patient I find no changes in the patients condition since the H&P had been written      Vitals:    11/14/22 0815   BP: 130/70   Pulse: 90   Resp: 18   Temp: (!) 97 4 °F (36 3 °C)   SpO2: 99%

## 2024-05-23 ENCOUNTER — TELEPHONE (OUTPATIENT)
Dept: FAMILY MEDICINE CLINIC | Facility: CLINIC | Age: 79
End: 2024-05-23

## 2024-05-24 NOTE — TELEPHONE ENCOUNTER
05/23/24 11:28 PM        The office's request has been received, reviewed, and the patient chart updated. The PCP has successfully been removed with a patient attribution note. This message will now be completed.        Thank you  Kyra Parada

## 2024-07-24 ENCOUNTER — OFFICE VISIT (OUTPATIENT)
Dept: FAMILY MEDICINE CLINIC | Facility: CLINIC | Age: 79
End: 2024-07-24
Payer: COMMERCIAL

## 2024-07-24 VITALS
OXYGEN SATURATION: 98 % | SYSTOLIC BLOOD PRESSURE: 120 MMHG | RESPIRATION RATE: 16 BRPM | WEIGHT: 127 LBS | BODY MASS INDEX: 23.98 KG/M2 | HEIGHT: 61 IN | TEMPERATURE: 97.6 F | DIASTOLIC BLOOD PRESSURE: 70 MMHG | HEART RATE: 76 BPM

## 2024-07-24 DIAGNOSIS — Z00.00 ANNUAL PHYSICAL EXAM: Primary | ICD-10-CM

## 2024-07-24 DIAGNOSIS — Z78.0 ASYMPTOMATIC POSTMENOPAUSAL STATUS: ICD-10-CM

## 2024-07-24 DIAGNOSIS — M85.80 OSTEOPENIA, UNSPECIFIED LOCATION: ICD-10-CM

## 2024-07-24 DIAGNOSIS — R73.01 IMPAIRED FASTING GLUCOSE: ICD-10-CM

## 2024-07-24 DIAGNOSIS — E78.5 HYPERLIPIDEMIA, UNSPECIFIED HYPERLIPIDEMIA TYPE: ICD-10-CM

## 2024-07-24 DIAGNOSIS — Z12.31 BREAST CANCER SCREENING BY MAMMOGRAM: ICD-10-CM

## 2024-07-24 PROCEDURE — 99397 PER PM REEVAL EST PAT 65+ YR: CPT | Performed by: NURSE PRACTITIONER

## 2024-07-24 NOTE — PROGRESS NOTES
[facility] HEALTH MAINTENANCE OFFICE VISIT  [facility]    NAME: Annie Mclean  AGE: 78 y.o. SEX: female  : 1945     DATE: 2024    Assessment and Plan     1. Annual physical exam  -     CBC; Future  -     Comprehensive metabolic panel; Future  -     Hemoglobin A1C; Future  -     Lipid panel; Future  -     TSH, 3rd generation; Future  2. Breast cancer screening by mammogram  -     Mammo screening bilateral w 3d & cad; Future  3. Asymptomatic postmenopausal status  -     DXA bone density spine hip and pelvis; Future; Expected date: 2024  4. Osteopenia, unspecified location  -     DXA bone density spine hip and pelvis; Future; Expected date: 2024  5. Hyperlipidemia, unspecified hyperlipidemia type  -     CBC; Future  -     Comprehensive metabolic panel; Future  -     Lipid panel; Future  -     TSH, 3rd generation; Future  6. Impaired fasting glucose  -     CBC; Future  -     Comprehensive metabolic panel; Future  -     Hemoglobin A1C; Future      Patient Counseling:   Nutrition: Stressed importance of a well balanced diet, moderation of sodium/saturated fat, caloric balance and sufficient intake of fiber  Exercise: Stressed the importance of regular exercise with a goal of 150 minutes per week  Dental Health: Discussed daily flossing and brushing and regular dental visits     Immunizations reviewed: Declined recommended vaccinations  Discussed benefits of:  Colon Cancer Screening,  up to date, mammogram due, dexa due. Blood work due.    Follow up in one year for annual exam or sooner if needed.         Chief Complaint     Chief Complaint   Patient presents with    [facility]    Well Check       History of Present Illness     Annie Mclean is a 78 year old female presenting today for annual exam.    She was last seen in office in .   Has been feeling well, has no complaints.   No new medical problems since last seen.     She is  very active, [place], does  a lot of outdoor work.   Moving soon, has been packing up the house as well.   Sleeping well.   Healthy eating--her  had a MI, and they have been eating a heart healthy diet together.     Follows with dermatology annually due to personal history of melanoma.     Declines all vaccines.           Well Adult Physical   Patient here for a comprehensive physical exam.      Diet and Physical Activity  Diet: well balanced diet  Exercise: frequently Likes to do yard work, projects, shopping. Stays active. Moving, packing boxes.      Depression Screen  PHQ-2/9 Depression Screening    Little interest or pleasure in doing things: 0 - not at all  Feeling down, depressed, or hopeless: 0 - not at all  PHQ-2 Score: 0  PHQ-2 Interpretation: Negative depression screen          General Health  Hearing: Normal:  bilateral  Vision: no vision problems and most recent eye exam <1 year Has cataracts, keeping an eye on.   Dental: regular dental visits    Reproductive Health  No issues  and Post-menopausal       The following portions of the patient's history were reviewed and updated as appropriate: allergies, current medications, past family history, past medical history, past social history, past surgical history and problem list.    Review of Systems     Review of Systems   Constitutional: Negative.    HENT: Negative.     Eyes:  Negative for visual disturbance.   Respiratory: Negative.     Cardiovascular: Negative.    Gastrointestinal: Negative.    Genitourinary: Negative.    Musculoskeletal: Negative.    Skin: Negative.    Neurological: Negative.    Hematological: Negative.    Psychiatric/Behavioral: Negative.         Past Medical History     Past Medical History:   Diagnosis Date    Degenerative disc disease, lumbar 6/10/2021    Dysplastic nevus     On Patient's Chest    Malignant melanoma (HCC)     Last Assessed 6/09/2015       Past Surgical History     Past Surgical History:    Procedure Laterality Date    DILATION AND CURETTAGE OF UTERUS      LAPAROSCOPIC TOTAL HYSTERECTOMY  11/20/2010    PA COLONOSCOPY FLX DX W/COLLJ SPEC WHEN PFRMD N/A 12/5/2017    Procedure: COLONOSCOPY;  Surgeon: Rich Diop DO;  Location: [facility];  Service: General    SALPINGOOPHORECTOMY Bilateral     Last Assessed 4/01/2016    SKIN CANCER EXCISION  2004    melanoma excision right buttocks    SKIN LESION EXCISION      Trunk Upper Chest    TONSILLECTOMY         Social History     Social History     Socioeconomic History    Marital status: /Civil Union     Spouse name: None    Number of children: None    Years of education: None    Highest education level: None   Occupational History    None   Tobacco Use    Smoking status: Never    Smokeless tobacco: Never   Vaping Use    Vaping status: Never Used   Substance and Sexual Activity    Alcohol use: Yes     Comment: rare alcohol use    Drug use: No    Sexual activity: Yes     Partners: Male     Birth control/protection: Post-menopausal, Surgical   Other Topics Concern    None   Social History Narrative    Daily Coffee Consumption (2 Cups/day)    Moderate Exercising Less Than 3 Times a week     Social Determinants of Health     Financial Resource Strain: Not on file   Food Insecurity: Not on file   Transportation Needs: Not on file   Physical Activity: Not on file   Stress: Not on file   Social Connections: Not on file   Intimate Partner Violence: Not on file   Housing Stability: Not on file       Family History     Family History   Problem Relation Age of Onset    Cancer Sister         colon and liver    Colon cancer Sister     Breast cancer Mother 59    Stomach cancer Father         esophageal    Ovarian cancer Neg Hx     Uterine cancer Neg Hx     Cervical cancer Neg Hx        Current Medications       Current Outpatient Medications:     Ascorbic Acid (vitamin C) 100 MG tablet, Take 100 mg by mouth daily, Disp: , Rfl:     Cholecalciferol (VITAMIN D PO),  "Take 1 capsule by mouth daily, Disp: , Rfl:     Multiple Vitamins-Minerals (MULTIVITAL PO), Take 1 tablet by mouth daily, Disp: , Rfl:      Allergies     Allergies   Allergen Reactions    Cephalexin Hives and Rash       Objective     /70   Pulse 76   Temp 97.6 °F (36.4 °C) (Temporal)   Resp 16   Ht 5' 1.42\" (1.56 m)   Wt 57.6 kg (127 lb)   SpO2 98%   BMI 23.67 kg/m²      Physical Exam  Vitals and nursing note reviewed.   Constitutional:       General: She is not in acute distress.     Appearance: Normal appearance. She is well-developed. She is not ill-appearing.   HENT:      Head: Normocephalic and atraumatic.      Left Ear: Tympanic membrane normal.      Mouth/Throat:      Mouth: Oropharynx is clear and moist. Mucous membranes are moist.   Eyes:      Extraocular Movements: EOM normal.      Conjunctiva/sclera: Conjunctivae normal.      Pupils: Pupils are equal, round, and reactive to light.   Neck:      Thyroid: No thyromegaly.      Vascular: No carotid bruit.   Cardiovascular:      Rate and Rhythm: Normal rate and regular rhythm.      Pulses:           Radial pulses are 2+ on the right side and 2+ on the left side.        Dorsalis pedis pulses are 2+ on the right side and 2+ on the left side.      Heart sounds: No murmur heard.  Pulmonary:      Effort: Pulmonary effort is normal.      Breath sounds: Normal breath sounds.   Abdominal:      General: Bowel sounds are normal.      Palpations: Abdomen is soft.      Tenderness: There is no abdominal tenderness.   Musculoskeletal:      Cervical back: Normal range of motion and neck supple.      Right lower leg: No edema.      Left lower leg: No edema.      Comments: Bilateral arthritic deformities of the hands.   Left second toe hammer toe.   Lymphadenopathy:      Cervical: No cervical adenopathy.   Skin:     Findings: No rash.   Neurological:      Mental Status: She is alert and oriented to person, place, and time.   Psychiatric:         Mood and Affect: " Mood and affect and mood normal.                 ALEKSANDER Sanchez  [facility]

## 2024-07-31 ENCOUNTER — APPOINTMENT (OUTPATIENT)
Dept: LAB | Facility: CLINIC | Age: 79
End: 2024-07-31
Payer: COMMERCIAL

## 2024-07-31 DIAGNOSIS — E78.5 HYPERLIPIDEMIA, UNSPECIFIED HYPERLIPIDEMIA TYPE: ICD-10-CM

## 2024-07-31 DIAGNOSIS — R73.03 PRE-DIABETES: ICD-10-CM

## 2024-07-31 DIAGNOSIS — E61.1 IRON DEFICIENCY: Primary | ICD-10-CM

## 2024-07-31 DIAGNOSIS — R73.01 IMPAIRED FASTING GLUCOSE: ICD-10-CM

## 2024-07-31 DIAGNOSIS — Z00.00 ANNUAL PHYSICAL EXAM: ICD-10-CM

## 2024-07-31 DIAGNOSIS — E87.1 HYPONATREMIA: ICD-10-CM

## 2024-07-31 LAB
ALBUMIN SERPL BCG-MCNC: 3.9 G/DL (ref 3.5–5)
ALP SERPL-CCNC: 59 U/L (ref 34–104)
ALT SERPL W P-5'-P-CCNC: 9 U/L (ref 7–52)
ANION GAP SERPL CALCULATED.3IONS-SCNC: 8 MMOL/L (ref 4–13)
AST SERPL W P-5'-P-CCNC: 15 U/L (ref 13–39)
BILIRUB SERPL-MCNC: 0.44 MG/DL (ref 0.2–1)
BUN SERPL-MCNC: 11 MG/DL (ref 5–25)
CALCIUM SERPL-MCNC: 9 MG/DL (ref 8.4–10.2)
CHLORIDE SERPL-SCNC: 98 MMOL/L (ref 96–108)
CHOLEST SERPL-MCNC: 227 MG/DL
CO2 SERPL-SCNC: 27 MMOL/L (ref 21–32)
CREAT SERPL-MCNC: 0.84 MG/DL (ref 0.6–1.3)
ERYTHROCYTE [DISTWIDTH] IN BLOOD BY AUTOMATED COUNT: 14.5 % (ref 11.6–15.1)
EST. AVERAGE GLUCOSE BLD GHB EST-MCNC: 117 MG/DL
GFR SERPL CREATININE-BSD FRML MDRD: 66 ML/MIN/1.73SQ M
GLUCOSE P FAST SERPL-MCNC: 89 MG/DL (ref 65–99)
HBA1C MFR BLD: 5.7 %
HCT VFR BLD AUTO: 37.3 % (ref 34.8–46.1)
HDLC SERPL-MCNC: 96 MG/DL
HGB BLD-MCNC: 12.2 G/DL (ref 11.5–15.4)
LDLC SERPL CALC-MCNC: 116 MG/DL (ref 0–100)
MCH RBC QN AUTO: 26.5 PG (ref 26.8–34.3)
MCHC RBC AUTO-ENTMCNC: 32.7 G/DL (ref 31.4–37.4)
MCV RBC AUTO: 81 FL (ref 82–98)
NONHDLC SERPL-MCNC: 131 MG/DL
PLATELET # BLD AUTO: 333 THOUSANDS/UL (ref 149–390)
PMV BLD AUTO: 10.7 FL (ref 8.9–12.7)
POTASSIUM SERPL-SCNC: 4 MMOL/L (ref 3.5–5.3)
PROT SERPL-MCNC: 6.6 G/DL (ref 6.4–8.4)
RBC # BLD AUTO: 4.61 MILLION/UL (ref 3.81–5.12)
SODIUM SERPL-SCNC: 133 MMOL/L (ref 135–147)
TRIGL SERPL-MCNC: 75 MG/DL
TSH SERPL DL<=0.05 MIU/L-ACNC: 1.25 UIU/ML (ref 0.45–4.5)
WBC # BLD AUTO: 5.21 THOUSAND/UL (ref 4.31–10.16)

## 2024-07-31 PROCEDURE — 80061 LIPID PANEL: CPT

## 2024-07-31 PROCEDURE — 36415 COLL VENOUS BLD VENIPUNCTURE: CPT

## 2024-07-31 PROCEDURE — 80053 COMPREHEN METABOLIC PANEL: CPT

## 2024-07-31 PROCEDURE — 83036 HEMOGLOBIN GLYCOSYLATED A1C: CPT

## 2024-07-31 PROCEDURE — 84443 ASSAY THYROID STIM HORMONE: CPT

## 2024-07-31 PROCEDURE — 85027 COMPLETE CBC AUTOMATED: CPT

## 2024-08-01 ENCOUNTER — TELEPHONE (OUTPATIENT)
Dept: FAMILY MEDICINE CLINIC | Facility: CLINIC | Age: 79
End: 2024-08-01

## 2024-08-01 NOTE — TELEPHONE ENCOUNTER
----- Message from ALEKSANDER Llanes sent at 7/31/2024 10:38 PM EDT -----  Please contact Annie with blood work results.   Sodium is slightly low, will monitor.   It looks like iron is a little low--please take a daily multivitamin with iron.   Stable cholesterol.   Hemoglobin A1c--test for diabetes is in the pre-diabetes range. Please limit sweets, sugary drinks.   Please repeat blood work in 6 months.   Please call with any questions.

## 2024-08-23 ENCOUNTER — TELEPHONE (OUTPATIENT)
Age: 79
End: 2024-08-23

## 2024-08-23 NOTE — TELEPHONE ENCOUNTER
Spoke with Billing and they stated that it was billed right and that the bill is for the ones that are not included in the wellness. So I spoke with patient and provided  information and she stated that she will pay the remaining balance. No other questions at this time

## 2024-08-23 NOTE — TELEPHONE ENCOUNTER
"Pt called regarding recent lab work she had done. She received a bill for $137 due to the coding not being \"preventative\" on the script. She states she was not sick, these were just routine labs. She would like to know what can be done, can this be resubmitted with proper coding? I did offer billing dept but she thought we need to write a new script.    Please advise patient at [phone]  "

## 2025-02-26 ENCOUNTER — HOSPITAL ENCOUNTER (OUTPATIENT)
Dept: MAMMOGRAPHY | Facility: HOSPITAL | Age: 80
Discharge: HOME | End: 2025-02-26
Payer: COMMERCIAL

## 2025-02-26 VITALS — BODY MASS INDEX: 23.37 KG/M2 | HEIGHT: 62 IN | WEIGHT: 127 LBS

## 2025-02-26 DIAGNOSIS — Z12.31 BREAST CANCER SCREENING BY MAMMOGRAM: ICD-10-CM

## 2025-02-26 PROCEDURE — 77067 SCR MAMMO BI INCL CAD: CPT

## 2025-02-26 PROCEDURE — 77063 BREAST TOMOSYNTHESIS BI: CPT

## 2025-07-30 ENCOUNTER — OFFICE VISIT (OUTPATIENT)
Dept: FAMILY MEDICINE CLINIC | Facility: CLINIC | Age: 80
End: 2025-07-30
Payer: COMMERCIAL

## 2025-07-30 VITALS
HEIGHT: 62 IN | WEIGHT: 128.4 LBS | OXYGEN SATURATION: 98 % | TEMPERATURE: 98.7 F | RESPIRATION RATE: 22 BRPM | HEART RATE: 77 BPM | SYSTOLIC BLOOD PRESSURE: 120 MMHG | DIASTOLIC BLOOD PRESSURE: 64 MMHG | BODY MASS INDEX: 23.63 KG/M2

## 2025-07-30 DIAGNOSIS — Z00.00 ANNUAL PHYSICAL EXAM: Primary | ICD-10-CM

## 2025-07-30 DIAGNOSIS — R73.03 PRE-DIABETES: ICD-10-CM

## 2025-07-30 DIAGNOSIS — M85.80 OSTEOPENIA, UNSPECIFIED LOCATION: ICD-10-CM

## 2025-07-30 DIAGNOSIS — E78.5 HYPERLIPIDEMIA, UNSPECIFIED HYPERLIPIDEMIA TYPE: ICD-10-CM

## 2025-07-30 PROBLEM — Z78.0 ASYMPTOMATIC POSTMENOPAUSAL STATUS: Status: RESOLVED | Noted: 2019-05-03 | Resolved: 2025-07-30

## 2025-07-30 PROCEDURE — 99397 PER PM REEVAL EST PAT 65+ YR: CPT | Performed by: NURSE PRACTITIONER
